# Patient Record
Sex: FEMALE | Race: BLACK OR AFRICAN AMERICAN | NOT HISPANIC OR LATINO | Employment: FULL TIME | URBAN - METROPOLITAN AREA
[De-identification: names, ages, dates, MRNs, and addresses within clinical notes are randomized per-mention and may not be internally consistent; named-entity substitution may affect disease eponyms.]

---

## 2024-01-22 ENCOUNTER — OFFICE VISIT (OUTPATIENT)
Dept: OBSTETRICS AND GYNECOLOGY | Facility: CLINIC | Age: 24
End: 2024-01-22
Payer: MEDICAID

## 2024-01-22 VITALS
WEIGHT: 152 LBS | SYSTOLIC BLOOD PRESSURE: 102 MMHG | DIASTOLIC BLOOD PRESSURE: 60 MMHG | HEIGHT: 64 IN | BODY MASS INDEX: 25.95 KG/M2

## 2024-01-22 DIAGNOSIS — Z01.419 WELL WOMAN EXAM WITH ROUTINE GYNECOLOGICAL EXAM: Primary | ICD-10-CM

## 2024-01-22 DIAGNOSIS — N63.0 BREAST MASS IN FEMALE: ICD-10-CM

## 2024-01-22 PROCEDURE — 1036F TOBACCO NON-USER: CPT | Performed by: OBSTETRICS & GYNECOLOGY

## 2024-01-22 PROCEDURE — 88175 CYTOPATH C/V AUTO FLUID REDO: CPT

## 2024-01-22 PROCEDURE — 99395 PREV VISIT EST AGE 18-39: CPT | Performed by: OBSTETRICS & GYNECOLOGY

## 2024-01-22 NOTE — PROGRESS NOTES
"Esau Villavicencio is a 23 y.o. female who is here for a routine exam.     Periods are regular every 28-30 days, lasting 5 days.     Sexually active: Abstinence    Regular self breast exam: yes  No new concerns on her exams  Right breast still with mass that has been evaluated by ultrasound.  Ultrasound due again    Menstrual History:  OB History          0    Para   0    Term   0       0    AB   0    Living   0         SAB   0    IAB   0    Ectopic   0    Multiple   0    Live Births   0                Patient's last menstrual period was 2023.         Review of Systems  All Normal Review of Systems  Constitutional: no fever, no chills, no recent weight gain, no recent weight loss and no fatigue.    Gastrointestinal: no abdominal pain, no constipation, no nausea, no diarrhea and no vomiting.    Genitourinary: no dysuria, no urinary incontinence, no vaginal dryness, no vaginal itching, no dyspareunia, no pelvic pain, no dysmenorrhea, no sexual problems, no change in urinary frequency, no vaginal discharge, no unexplained vaginal bleeding and no lesion/sore.    Breasts: No masses.  No nipple discharge.  No redness    Objective   /60   Ht 1.626 m (5' 4\")   Wt 68.9 kg (152 lb)   LMP 2023   BMI 26.09 kg/m²     OBGyn Exam   Physical Exam  Constitutional: Alert and in no acute distress. Well developed, well nourished.   Head and Face: Head and face: Normal.    Eyes: Normal external exam - nonicteric sclera, extraocular movements intact (EOMI) and no ptosis.   Ears, Nose, Mouth, and Throat: External inspection of ears and nose: Normal.    Neck: No neck asymmetry. Supple. Thyroid not enlarged and there were no palpable thyroid nodules.   Chest: Breasts: Normal appearance, no nipple discharge and no skin changes. Palpation of breasts and axillae: Right breast mass noted in upper outer quadrant as per last time palpable mass and no axillary lymphadenopathy.   Abdomen: Soft nontender; no abdominal " mass palpated. No organomegaly. No hernias.     Genitourinary:   External genitalia: Normal. No inguinal lymphadenopathy. Bartholin's Urethral and Skenes Glands: Normal. Urethra: Normal.    Bladder: Normal on palpation.   Vagina: Normal. No discharge  Cervix: Normal.    Uterus: Normal.    Right Adnexa/parametria: Normal.  Left Adnexa/parametria: Normal.    Inspection of Perianal Area: Normal.     Musculoskeletal: No joint swelling seen, normal movements of all extremities.   Skin: Normal skin color and pigmentation, normal skin turgor, and no rash.   Neurologic: Non-focal. Grossly intact.   Psychiatric: Alert and oriented x 3. Affect normal to patient baseline. Mood: Appropriate.      Assessment/Plan   1) Annual exam:  Pap with reflex HPV testing completed today    2) Right breast mass   Follow-up ultrasound ordered.  Will schedule.    Thank you again for your visit to our office today  Please follow-up as needed or in 1 year with your annual exam

## 2024-02-02 LAB
CYTOLOGY CMNT CVX/VAG CYTO-IMP: NORMAL
LAB AP HPV GENOTYPE QUESTION: YES
LAB AP HPV HR: NORMAL
LABORATORY COMMENT REPORT: NORMAL
LMP START DATE: NORMAL
PATH REPORT.TOTAL CANCER: NORMAL

## 2024-02-21 ENCOUNTER — HOSPITAL ENCOUNTER (OUTPATIENT)
Dept: RADIOLOGY | Facility: CLINIC | Age: 24
Discharge: HOME | End: 2024-02-21
Payer: MEDICAID

## 2024-02-21 DIAGNOSIS — R92.8 OTHER ABNORMAL AND INCONCLUSIVE FINDINGS ON DIAGNOSTIC IMAGING OF BREAST: ICD-10-CM

## 2024-02-21 PROCEDURE — 76642 ULTRASOUND BREAST LIMITED: CPT | Mod: LT

## 2024-02-21 PROCEDURE — 76642 ULTRASOUND BREAST LIMITED: CPT | Mod: LEFT SIDE | Performed by: STUDENT IN AN ORGANIZED HEALTH CARE EDUCATION/TRAINING PROGRAM

## 2024-02-21 PROCEDURE — 76982 USE 1ST TARGET LESION: CPT | Mod: LT

## 2024-02-25 DIAGNOSIS — Z12.31 VISIT FOR SCREENING MAMMOGRAM: Primary | ICD-10-CM

## 2024-02-25 DIAGNOSIS — R92.8 ABNORMAL MAMMOGRAM: ICD-10-CM

## 2024-12-12 ENCOUNTER — HOSPITAL ENCOUNTER (OUTPATIENT)
Dept: RADIOLOGY | Facility: CLINIC | Age: 24
Discharge: HOME | End: 2024-12-12
Payer: MEDICAID

## 2024-12-12 DIAGNOSIS — Z12.31 VISIT FOR SCREENING MAMMOGRAM: ICD-10-CM

## 2025-01-27 ENCOUNTER — APPOINTMENT (OUTPATIENT)
Dept: OBSTETRICS AND GYNECOLOGY | Facility: CLINIC | Age: 25
End: 2025-01-27
Payer: MEDICAID

## 2025-01-27 VITALS
DIASTOLIC BLOOD PRESSURE: 52 MMHG | BODY MASS INDEX: 29.53 KG/M2 | WEIGHT: 173 LBS | SYSTOLIC BLOOD PRESSURE: 104 MMHG | HEIGHT: 64 IN

## 2025-01-27 DIAGNOSIS — Z01.419 WELL WOMAN EXAM WITH ROUTINE GYNECOLOGICAL EXAM: Primary | ICD-10-CM

## 2025-01-27 DIAGNOSIS — N63.0 BREAST MASS IN FEMALE: ICD-10-CM

## 2025-01-27 PROCEDURE — 1036F TOBACCO NON-USER: CPT | Performed by: OBSTETRICS & GYNECOLOGY

## 2025-01-27 PROCEDURE — 3008F BODY MASS INDEX DOCD: CPT | Performed by: OBSTETRICS & GYNECOLOGY

## 2025-01-27 PROCEDURE — 99395 PREV VISIT EST AGE 18-39: CPT | Performed by: OBSTETRICS & GYNECOLOGY

## 2025-01-27 SDOH — ECONOMIC STABILITY: TRANSPORTATION INSECURITY
IN THE PAST 12 MONTHS, HAS THE LACK OF TRANSPORTATION KEPT YOU FROM MEDICAL APPOINTMENTS OR FROM GETTING MEDICATIONS?: NO

## 2025-01-27 SDOH — ECONOMIC STABILITY: FOOD INSECURITY: WITHIN THE PAST 12 MONTHS, THE FOOD YOU BOUGHT JUST DIDN'T LAST AND YOU DIDN'T HAVE MONEY TO GET MORE.: NEVER TRUE

## 2025-01-27 SDOH — ECONOMIC STABILITY: FOOD INSECURITY: WITHIN THE PAST 12 MONTHS, YOU WORRIED THAT YOUR FOOD WOULD RUN OUT BEFORE YOU GOT MONEY TO BUY MORE.: NEVER TRUE

## 2025-01-27 SDOH — ECONOMIC STABILITY: INCOME INSECURITY: IN THE LAST 12 MONTHS, WAS THERE A TIME WHEN YOU WERE NOT ABLE TO PAY THE MORTGAGE OR RENT ON TIME?: NO

## 2025-01-27 SDOH — ECONOMIC STABILITY: TRANSPORTATION INSECURITY
IN THE PAST 12 MONTHS, HAS LACK OF TRANSPORTATION KEPT YOU FROM MEETINGS, WORK, OR FROM GETTING THINGS NEEDED FOR DAILY LIVING?: NO

## 2025-01-27 ASSESSMENT — SOCIAL DETERMINANTS OF HEALTH (SDOH)
WITHIN THE LAST YEAR, HAVE YOU BEEN KICKED, HIT, SLAPPED, OR OTHERWISE PHYSICALLY HURT BY YOUR PARTNER OR EX-PARTNER?: NO
WITHIN THE LAST YEAR, HAVE YOU BEEN AFRAID OF YOUR PARTNER OR EX-PARTNER?: NO
WITHIN THE LAST YEAR, HAVE TO BEEN RAPED OR FORCED TO HAVE ANY KIND OF SEXUAL ACTIVITY BY YOUR PARTNER OR EX-PARTNER?: NO
WITHIN THE LAST YEAR, HAVE YOU BEEN HUMILIATED OR EMOTIONALLY ABUSED IN OTHER WAYS BY YOUR PARTNER OR EX-PARTNER?: NO
HOW HARD IS IT FOR YOU TO PAY FOR THE VERY BASICS LIKE FOOD, HOUSING, MEDICAL CARE, AND HEATING?: NOT HARD AT ALL

## 2025-01-27 ASSESSMENT — PATIENT HEALTH QUESTIONNAIRE - PHQ9
1. LITTLE INTEREST OR PLEASURE IN DOING THINGS: NOT AT ALL
SUM OF ALL RESPONSES TO PHQ9 QUESTIONS 1 AND 2: 0
2. FEELING DOWN, DEPRESSED OR HOPELESS: NOT AT ALL

## 2025-01-27 ASSESSMENT — ENCOUNTER SYMPTOMS
DEPRESSION: 0
LOSS OF SENSATION IN FEET: 0
OCCASIONAL FEELINGS OF UNSTEADINESS: 0

## 2025-01-27 ASSESSMENT — LIFESTYLE VARIABLES
HOW OFTEN DO YOU HAVE A DRINK CONTAINING ALCOHOL: NEVER
HOW MANY STANDARD DRINKS CONTAINING ALCOHOL DO YOU HAVE ON A TYPICAL DAY: PATIENT DOES NOT DRINK
HOW OFTEN DO YOU HAVE SIX OR MORE DRINKS ON ONE OCCASION: NEVER
SKIP TO QUESTIONS 9-10: 1
AUDIT-C TOTAL SCORE: 0

## 2025-01-27 NOTE — PROGRESS NOTES
"Esau Villavicencio is a 24 y.o. female who is here for a annual exam.     Periods are regular every 28-30 days, lasting 3-5  days.       Sexually active: Abstinence    Regular self breast exam: yes  no concerns on her exams    Menstrual History:  OB History          0    Para   0    Term   0       0    AB   0    Living   0         SAB   0    IAB   0    Ectopic   0    Multiple   0    Live Births   0                Patient's last menstrual period was 2025.         Review of Systems  All Normal Review of Systems  Constitutional: no fever, no chills, no recent weight gain, no recent weight loss and no fatigue.    Gastrointestinal: no abdominal pain, no constipation, no nausea, no diarrhea and no vomiting.    Genitourinary: no dysuria, no urinary incontinence, no vaginal dryness, no vaginal itching, no dyspareunia, no pelvic pain, no dysmenorrhea, no sexual problems, no change in urinary frequency, no vaginal discharge, no unexplained vaginal bleeding and no lesion/sore.    Breasts: No masses.  No nipple discharge.  No redness    Objective   /52   Ht 1.626 m (5' 4\")   Wt 78.5 kg (173 lb)   LMP 2025   BMI 29.70 kg/m²     OBGyn Exam   Physical Exam  Constitutional: Alert and in no acute distress. Well developed, well nourished.   Head and Face: Head and face: Normal.    Eyes: Normal external exam - nonicteric sclera, extraocular movements intact (EOMI) and no ptosis.   Ears, Nose, Mouth, and Throat: External inspection of ears and nose: Normal.    Neck: No neck asymmetry. Supple. Thyroid not enlarged and there were no palpable thyroid nodules.   Chest: Breasts: Normal appearance, no nipple discharge and no skin changes. Palpation of breasts and axillae: No palpable mass and no axillary lymphadenopathy on the right breast.  Left breast with stable upper outer quadrant mass.  Abdomen: Soft nontender; no abdominal mass palpated. No organomegaly. No hernias.     Genitourinary:   External " genitalia: Normal. No inguinal lymphadenopathy. Bartholin's Urethral and Skenes Glands: Normal. Urethra: Normal.    Bladder: Normal on palpation.   Vagina: Normal. No discharge  Cervix: Normal.    Uterus: Normal.    Right Adnexa/parametria: Normal.  Left Adnexa/parametria: Normal.    Inspection of Perianal Area: Normal.     Musculoskeletal: No joint swelling seen, normal movements of all extremities.   Skin: Normal skin color and pigmentation, normal skin turgor, and no rash.   Neurologic: Non-focal. Grossly intact.   Psychiatric: Alert and oriented x 3. Affect normal to patient baseline. Mood: Appropriate.      Assessment/Plan   1) Annual exam:  Normal exam today  Pap with reflex HPV testing completed    2) stable mass in left upper outer quadrant.  Ultrasound ordered.  Advise she see the breast center yearly but 6 months apart from my visit    Thank you again for your visit to our office today  Please follow-up as needed or in 1 year with your annual exam

## 2025-02-06 LAB
CYTOLOGY CMNT CVX/VAG CYTO-IMP: NORMAL
LAB AP HPV GENOTYPE QUESTION: YES
LAB AP HPV HR: NORMAL
LABORATORY COMMENT REPORT: NORMAL
PATH REPORT.TOTAL CANCER: NORMAL

## 2025-02-24 ENCOUNTER — APPOINTMENT (OUTPATIENT)
Dept: RADIOLOGY | Facility: CLINIC | Age: 25
End: 2025-02-24
Payer: MEDICAID

## 2025-07-09 ENCOUNTER — APPOINTMENT (OUTPATIENT)
Dept: OPHTHALMOLOGY | Facility: CLINIC | Age: 25
End: 2025-07-09
Payer: MEDICAID

## 2025-07-09 DIAGNOSIS — H52.13 MYOPIA OF BOTH EYES WITH REGULAR ASTIGMATISM: Primary | ICD-10-CM

## 2025-07-09 DIAGNOSIS — H52.223 MYOPIA OF BOTH EYES WITH REGULAR ASTIGMATISM: Primary | ICD-10-CM

## 2025-07-09 PROCEDURE — 92004 COMPRE OPH EXAM NEW PT 1/>: CPT | Performed by: STUDENT IN AN ORGANIZED HEALTH CARE EDUCATION/TRAINING PROGRAM

## 2025-07-09 PROCEDURE — 92015 DETERMINE REFRACTIVE STATE: CPT | Performed by: STUDENT IN AN ORGANIZED HEALTH CARE EDUCATION/TRAINING PROGRAM

## 2025-07-09 PROCEDURE — FLVLF CONTACT LENS EVALUATION (SP): Performed by: STUDENT IN AN ORGANIZED HEALTH CARE EDUCATION/TRAINING PROGRAM

## 2025-07-09 ASSESSMENT — REFRACTION_CURRENTRX
OS_AXIS: 180
OD_DIAMETER: 14.0
OS_DIAMETER: 14.0
OS_BASECURVE: 8.4
OD_SPHERE: -2.75
OS_CYLINDER: -0.75
OD_AXIS: 180
OD_BRAND: ACUVUE OASYS FOR ASTIGMATISM
OD_DIAMETER: 14.0
OS_SPHERE: -3.50
OD_CYLINDER: -0.75
OD_CYLINDER: SPHERE
OS_BRAND: ACUVUE OASYS
OS_SPHERE: -3.75
OD_SPHERE: -3.25
OD_BASECURVE: 8.4
OD_BRAND: ACUVUE OASYS
OD_BASECURVE: 8.6
OS_DIAMETER: 14.0
OS_BASECURVE: 8.6
OS_BRAND: ACUVUE OASYS FOR ASTIGMATISM
OS_CYLINDER: SPHERE

## 2025-07-09 ASSESSMENT — REFRACTION_WEARINGRX
OS_AXIS: 003
OD_AXIS: 005
SPECS_TYPE: SVL
OS_SPHERE: -3.25
OS_CYLINDER: -0.75
OD_CYLINDER: -0.50
OD_SPHERE: -2.75

## 2025-07-09 ASSESSMENT — CUP TO DISC RATIO
OD_RATIO: .30
OS_RATIO: .30

## 2025-07-09 ASSESSMENT — ENCOUNTER SYMPTOMS
NEUROLOGICAL NEGATIVE: 0
RESPIRATORY NEGATIVE: 0
ENDOCRINE NEGATIVE: 0
PSYCHIATRIC NEGATIVE: 0
GASTROINTESTINAL NEGATIVE: 0
CONSTITUTIONAL NEGATIVE: 0
MUSCULOSKELETAL NEGATIVE: 0
EYES NEGATIVE: 1
CARDIOVASCULAR NEGATIVE: 0
HEMATOLOGIC/LYMPHATIC NEGATIVE: 0
ALLERGIC/IMMUNOLOGIC NEGATIVE: 0

## 2025-07-09 ASSESSMENT — REFRACTION_MANIFEST
OS_CYLINDER: -0.50
OD_AXIS: 005
OS_SPHERE: -3.75
OS_AXIS: 003
OD_SPHERE: -3.00
OD_CYLINDER: -0.50

## 2025-07-09 ASSESSMENT — VISUAL ACUITY
METHOD: SNELLEN - LINEAR
OD_CC: 20/20
CORRECTION_TYPE: GLASSES
OS_CC: 20/20

## 2025-07-09 ASSESSMENT — CONF VISUAL FIELD
OS_NORMAL: 1
OD_NORMAL: 1
OS_INFERIOR_TEMPORAL_RESTRICTION: 0
OS_SUPERIOR_NASAL_RESTRICTION: 0
OS_INFERIOR_NASAL_RESTRICTION: 0
OD_SUPERIOR_NASAL_RESTRICTION: 0
OD_INFERIOR_TEMPORAL_RESTRICTION: 0
OD_INFERIOR_NASAL_RESTRICTION: 0
METHOD: COUNTING FINGERS
OS_SUPERIOR_TEMPORAL_RESTRICTION: 0
OD_SUPERIOR_TEMPORAL_RESTRICTION: 0

## 2025-07-09 ASSESSMENT — SLIT LAMP EXAM - LIDS
COMMENTS: NORMAL
COMMENTS: NORMAL

## 2025-07-09 ASSESSMENT — TONOMETRY
OS_IOP_MMHG: 13
IOP_METHOD: GOLDMANN APPLANATION
OD_IOP_MMHG: 13

## 2025-07-09 ASSESSMENT — EXTERNAL EXAM - RIGHT EYE: OD_EXAM: NORMAL

## 2025-07-09 ASSESSMENT — EXTERNAL EXAM - LEFT EYE: OS_EXAM: NORMAL

## 2025-07-09 NOTE — PROGRESS NOTES
Assessment/Plan   Diagnoses and all orders for this visit:  Myopia of both eyes with regular astigmatism  -New spec rx released today per patient request. Ocular health wnl for age OU. Monitor 1 year or sooner prn. Refraction billed today.  -Discussed proper wear, care, replacement of contact lenses. Gave handout. D/c cl wear and RTC if eyes become red, painful, irritated. Monitor 1 year.   CL eval billed today. $35 Finalized RX for Oasys Toric with good comfort/fit/VA  -Patient has habitually wore toric lenses vs spherical  -attempted spherical trial today but patient accepts Toric lenses with better visual comfort    RTC 1 year for annual with DFE and MRX/CL exam

## 2025-07-14 NOTE — PROGRESS NOTES
Esau Villavicencio female   2000 25 y.o.   [id number]      Chief Complaint  Left breast masses    History Of Present Illness  Esau Villavicencio is a very pleasant 25 y.o. AA female referred to the [facility] by Janell Portillo for left breast mass. She has been followed in the [facility] Center for stable left breast masses since 2023. She has no family history of breast cancer. She denies breast surgery or biopsy. She states the mass does not bother her or cause pain.     BREAST IMAGIN2024 Left breast ultrasound, indicates BI-RADS Category 3. 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     REPRODUCTIVE HISTORY: menarche age 13, nulliparous, no OCP's, premenopausal, LMP 2025    FAMILY CANCER HISTORY:   Mother: Colon cancer, age 40     Surgical History  She has no past surgical history on file.     Social History  She reports that she has quit smoking. She has never used smokeless tobacco. She reports that she does not currently use alcohol. She reports that she does not currently use drugs after having used the following drugs: Marijuana.    Family History  Family History[1]     Allergies  Shellfish containing products and Penicillins    Medications  No current outpatient medications      REVIEW OF SYSTEMS    Constitutional:  Negative for appetite change, fatigue, fever and unexpected weight change.   HENT:  Negative for ear pain, hearing loss, nosebleeds, sore throat and trouble swallowing.    Eyes:  Negative for discharge, itching and visual disturbance.   Respiratory:  Negative for cough, chest tightness and shortness of breath.    Cardiovascular:  Negative for chest pain, palpitations and leg swelling.   Breast: as indicated in HPI  Gastrointestinal:  Negative for abdominal pain, constipation, diarrhea and nausea.   Endocrine: Negative for cold intolerance and heat intolerance.   Genitourinary:  Negative for dysuria, frequency, hematuria,  pelvic pain and vaginal bleeding.   Musculoskeletal:  Negative for arthralgias, back pain, gait problem, joint swelling and myalgias.   Skin:  Negative for color change and rash.   Allergic/Immunologic: Negative for environmental allergies and food allergies.   Neurological:  Negative for dizziness, tremors, speech difficulty, weakness, numbness and headaches.   Hematological:  Does not bruise/bleed easily.   Psychiatric/Behavioral:  Negative for agitation, dysphoric mood and sleep disturbance. The patient is not nervous/anxious.         Past Medical History  She has no past medical history on file.     Physical Exam  Patient is alert and oriented x3 and in a relaxed and appropriate mood. Her gait is steady and hand grasps are equal. Sclera is clear. The breasts are full and nearly symmetrical. The tissue is soft without palpable abnormalities, discrete nodules or masses. The left breast, 2:00, 3 cm from the nipple with 2 x 4 cm soft, mobile oval benign mass. The skin and nipples appear normal. There is no cervical, supraclavicular or axillary lymphadenopathy. Heart rate and rhythm normal, S1 and S2 appreciated. The lungs are clear to auscultation bilaterally.       Physical Exam  Chest:            Last Recorded Vitals  Vitals:    07/28/25 0925   BP: 110/71   Pulse: 77   Temp: 36.2 °C (97.1 °F)   SpO2: 97%       Relevant Results   Time was spent viewing digital images of the radiology testing with the patient. I explained the results in depth, along with suggested explanation for follow up recommendations based on the testing results. BI-RADS Category 2    Imaging  Narrative & Impression   Interpreted By:  Annabelle Buckley,   STUDY:  BI US BREAST LIMITED LEFT;  7/28/2025 9:22 am      ACCESSION NUMBER(S):  [id number]      ORDERING CLINICIAN:  EVON BRANDT      INDICATION:  25-year-old patient presenting for delayed final follow-up of  probably benign masses in the left breast.      ,N63.0 Unspecified lump in  unspecified breast      COMPARISON:  02/21/2024, 08/21/2023, 02/03/2023.      FINDINGS:  ULTRASOUND: Targeted ultrasound was performed of the left breast at  the area of palpable lumps by a registered sonographer with  elastography. 3 abutting circumscribed parallel hypoechoic masses are  seen at 2 o'clock 3 cm from the nipple. In total the masses measure  up to 4.2 x 1.3 cm, which is not significantly different compared to  the ultrasound from 08/21/2023, where they measured up to 4.2 x 1.5  cm. There is no significant associated vascularity. The masses are  soft on elastography.      IMPRESSION:  Stable abutting masses in the left breast, which have the typical  appearance of fibroadenomas. Given that the overall size is not  significantly changed compared to the study from 08/21/2023, this is  most consistent with a benign etiology. Kacey Reece CNP discussed the  possibility of excision with the patient given the overall size of  4.2 cm. Patient will think about excision at this time.      BI-RADS CATEGORY:  BI-RADS Category:  2 Benign.  Recommendation:  Clinical follow-up.  Recommended Date:  At the discretion of the ordering provider.  Laterality:  Left.       Assessment/Plan   Normal clinical exam and imaging, left breast mass, stable and benign, no family history of breast cancer    Plan: Return to PCP for annual breast exams. Discussed option for excision due to size of mass, declines at this time. She is aware if she elects for excision, a biopsy may be recommended prior. She will contact office if decided.     Patient Discussion/Summary  Your clinical examination and imaging are normal. You no longer need to be seen by a breast specialist for an annual physical breast examination. It is important to continue annual breast exams through your primary care provider. Please return to see me if you have a new breast problem or abnormal mammogram. It has been a pleasure having you as a patient.     You can see  your health information, review clinical summaries from office visits & test results online when you follow your health with MY  Chart, a personal health record. To sign up go to [email or web address]. If you need assistance with signing up or trouble getting into your account call [facility] Patient Line 24/7 at [phone].    My office phone number is [phone] if you need to get in touch with me or have additional questions or concerns. Thank you for choosing [facility] and trusting me as your healthcare provider. I am honored to be a provider on your health care team and I remain dedicated to helping you achieve your health goals.     Annia Reece, APRN-CNP         [1]   Family History  Problem Relation Name Age of Onset    Cancer Mother Dencie Villavicencio     Colon cancer Mother Denice Villavicencio     Cancer Mother Denice Villavicencio

## 2025-07-28 ENCOUNTER — OFFICE VISIT (OUTPATIENT)
Dept: SURGICAL ONCOLOGY | Facility: CLINIC | Age: 25
End: 2025-07-28
Payer: MEDICAID

## 2025-07-28 ENCOUNTER — HOSPITAL ENCOUNTER (OUTPATIENT)
Dept: RADIOLOGY | Facility: CLINIC | Age: 25
Discharge: HOME | End: 2025-07-28
Payer: MEDICAID

## 2025-07-28 VITALS
HEART RATE: 77 BPM | OXYGEN SATURATION: 97 % | DIASTOLIC BLOOD PRESSURE: 71 MMHG | TEMPERATURE: 97.1 F | SYSTOLIC BLOOD PRESSURE: 110 MMHG

## 2025-07-28 DIAGNOSIS — N63.21 MASS OF UPPER OUTER QUADRANT OF LEFT BREAST: Primary | ICD-10-CM

## 2025-07-28 DIAGNOSIS — N63.0 BREAST MASS IN FEMALE: ICD-10-CM

## 2025-07-28 PROCEDURE — 99203 OFFICE O/P NEW LOW 30 MIN: CPT | Performed by: NURSE PRACTITIONER

## 2025-07-28 PROCEDURE — 76642 ULTRASOUND BREAST LIMITED: CPT | Mod: LEFT SIDE | Performed by: STUDENT IN AN ORGANIZED HEALTH CARE EDUCATION/TRAINING PROGRAM

## 2025-07-28 PROCEDURE — 76642 ULTRASOUND BREAST LIMITED: CPT | Mod: LT

## 2025-07-28 PROCEDURE — 99213 OFFICE O/P EST LOW 20 MIN: CPT | Mod: 25 | Performed by: NURSE PRACTITIONER

## 2025-07-28 PROCEDURE — 76982 USE 1ST TARGET LESION: CPT

## 2025-07-28 ASSESSMENT — PAIN SCALES - GENERAL: PAINLEVEL_OUTOF10: 0-NO PAIN

## 2025-07-28 NOTE — PATIENT INSTRUCTIONS
Your clinical examination and imaging are normal. You no longer need to be seen by a breast specialist for an annual physical breast examination. It is important to continue annual breast exams through your primary care provider. Please return to see me if you have a new breast problem or abnormal mammogram. It has been a pleasure having you as a patient.     You can see your health information, review clinical summaries from office visits & test results online when you follow your health with MY  Chart, a personal health record. To sign up go to [email or web address]. If you need assistance with signing up or trouble getting into your account call [facility] Patient Line 24/7 at [phone].    My office phone number is [phone] if you need to get in touch with me or have additional questions or concerns. Thank you for choosing [facility] and trusting me as your healthcare provider. I am honored to be a provider on your health care team and I remain dedicated to helping you achieve your health goals.

## 2025-12-10 ENCOUNTER — APPOINTMENT (OUTPATIENT)
Dept: ALLERGY | Facility: HOSPITAL | Age: 25
End: 2025-12-10
Payer: MEDICAID

## 2026-01-29 ENCOUNTER — APPOINTMENT (OUTPATIENT)
Dept: OBSTETRICS AND GYNECOLOGY | Facility: CLINIC | Age: 26
End: 2026-01-29
Payer: MEDICAID

## 2026-07-15 ENCOUNTER — APPOINTMENT (OUTPATIENT)
Dept: OPHTHALMOLOGY | Facility: CLINIC | Age: 26
End: 2026-07-15
Payer: MEDICAID